# Patient Record
Sex: FEMALE | HISPANIC OR LATINO | Employment: PART TIME | URBAN - METROPOLITAN AREA
[De-identification: names, ages, dates, MRNs, and addresses within clinical notes are randomized per-mention and may not be internally consistent; named-entity substitution may affect disease eponyms.]

---

## 2021-11-09 ENCOUNTER — OFFICE VISIT (OUTPATIENT)
Dept: ORTHOPEDIC SURGERY | Facility: CLINIC | Age: 56
End: 2021-11-09

## 2021-11-09 VITALS
WEIGHT: 168 LBS | DIASTOLIC BLOOD PRESSURE: 78 MMHG | BODY MASS INDEX: 28.68 KG/M2 | HEART RATE: 86 BPM | SYSTOLIC BLOOD PRESSURE: 127 MMHG | HEIGHT: 64 IN

## 2021-11-09 DIAGNOSIS — M15.1 OSTEOARTHRITIS OF DISTAL INTERPHALANGEAL (DIP) JOINT OF LEFT MIDDLE FINGER: ICD-10-CM

## 2021-11-09 DIAGNOSIS — M15.1: ICD-10-CM

## 2021-11-09 DIAGNOSIS — M79.642 HAND PAIN, LEFT: Primary | ICD-10-CM

## 2021-11-09 PROCEDURE — 99203 OFFICE O/P NEW LOW 30 MIN: CPT | Performed by: PHYSICIAN ASSISTANT

## 2021-11-09 RX ORDER — BENAZEPRIL HYDROCHLORIDE 20 MG/1
TABLET ORAL
COMMUNITY
Start: 2021-10-18

## 2021-11-09 RX ORDER — GLIPIZIDE 10 MG/1
TABLET, FILM COATED, EXTENDED RELEASE ORAL
COMMUNITY
Start: 2021-10-18

## 2021-11-09 RX ORDER — PRAVASTATIN SODIUM 20 MG
TABLET ORAL
COMMUNITY
Start: 2021-10-18

## 2021-11-09 NOTE — PROGRESS NOTES
[facility] [place] [facility] Note    Subjective     Chief Complaint   Patient presents with   • Left Hand - Pain        HPI  Donna Restrepo is a 56 y.o. female.  Right-hand-dominant.  New patient presents for evaluation of left middle finger DIP pain.  Symptoms/pain have been ongoing for over 6 months.  YAHIR: No history of injury or trauma.    Pain scale: 5/10.  Severity of the pain mild to mod.  Quality of the pain shooting.  Associated symptoms swelling.  Activity related to pain use of digit.  Pain relieved by nothing.  No reported numbness or tingling.      Denies fever, chills, night sweats or other constitutional symptoms.      Past Medical History:   Diagnosis Date   • Diabetes (HCC)    • HTN (hypertension)       Past Surgical History:   Procedure Laterality Date   • HYSTERECTOMY        Family History   Problem Relation Age of Onset   • Diabetes Mother    • Hypertension Father      Social History     Socioeconomic History   • Marital status:    Tobacco Use   • Smoking status: Never Smoker   • Smokeless tobacco: Never Used   Substance and Sexual Activity   • Alcohol use: Not Currently   • Drug use: Never   • Sexual activity: Defer      Current Outpatient Medications on File Prior to Visit   Medication Sig Dispense Refill   • benazepril (LOTENSIN) 20 MG tablet      • glipizide (GLUCOTROL XL) 10 MG 24 hr tablet      • pravastatin (PRAVACHOL) 20 MG tablet        No current facility-administered medications on file prior to visit.      No Known Allergies     The following portions of the patient's history were reviewed and updated as appropriate: allergies, current medications, past family history, past medical history, past social history, past surgical history and problem list.    Review of Systems   Constitutional: Negative.    HENT: Negative.    Eyes: Negative.    Respiratory: Negative.    Cardiovascular: Negative.    Gastrointestinal: Negative.    Endocrine: Negative.    Genitourinary:  "Negative.    Musculoskeletal: Positive for arthralgias.   Skin: Negative.    Allergic/Immunologic: Negative.    Neurological: Negative.    Hematological: Negative.    Psychiatric/Behavioral: Negative.         Objective      Physical Exam  /78   Pulse 86   Ht 162.6 cm (64\")   Wt 76.2 kg (168 lb)   BMI 28.84 kg/m²     Body mass index is 28.84 kg/m².    GENERAL APPEARANCE: awake, alert & oriented x 3, in no acute distress and well developed, well nourished  PSYCH: normal mood and affect  LUNGS:  breathing nonlabored, no wheezing  EYES: sclera anicteric, pupils equal  CARDIOVASCULAR: palpable pulses. Capillary refill less than 2 seconds  INTEGUMENTARY: skin intact, no clubbing, cyanosis  NEUROLOGIC:  Normal Sensation         Ortho Exam  Left hand/middle finger  Skin: Grossly intact without any redness or warmth.  Positive soft tissue swelling noted about the DIP.  Patient also has palpable Heberden nodules noted.  Additionally presence of a ridge noted in the nail.  No evidence of a mucoid cyst at this time.  Tenderness: Positive tenderness noted dorsal DIP.  FDS, FDP and extensor tendon are intact.  Sensation intact to radial/ulnar/tip finger  Motor: Intact R/U/M/AIN/PIN  Sensory: Intact R/U/M   Vascular: 2+ radial pulse with brisk CR into each digit    Patient has similar findings on left ring finger but not symptomatic as middle finger.      Imaging/Studies  Ordered left hand plain films.  Imaging read/interpreted by Dr. Camara.    Imaging Results (Last 7 Days)     Procedure Component Value Units Date/Time    XR Hand 3+ View Left [[id number]] Resulted: 11/09/21 1652     Updated: 11/09/21 1653    Narrative:      Left Hand X-Ray    Indication: Pain    Views:  AP, Lateral, and Oblique     Comparison: None    Findings:  No fracture  No bony lesion  Normal soft tissues  There are severe degenerative changes noted at the long and ring digit DIP   joints.  Moderate to early severe degenerative changes are noted "   throughout the remainder of the IP joints of the hand.    Impression:   Degenerative changes as noted above.  No acute bony abnormality noted.            Assessment/Plan        ICD-10-CM ICD-9-CM   1. Hand pain, left  M79.642 729.5   2. Osteoarthritis of distal interphalangeal (DIP) joint of left middle finger  M15.1 715.94   3. Osteoarthritis of distal interphalangeal (DIP) joint of left ring finger  M15.1 715.94       Orders Placed This Encounter   Procedures   • XR Hand 3+ View Left        -Left hand pain to the middle finger DIP due to osteoarthritis--treatment options discussed with the patient including observation versus referral to a hand surgeon for further evaluation and treatment.  At this time patient wishes to proceed with observation only.  She will contact the clinic if and when she desires a referral to a hand surgeon for further evaluation and treatment.  -Was also explained that the groove she notes into the nail of the middle finger is secondary to the arthritis.  -Left ring finger DIP osteoarthritis--currently asymptomatic.  We will continue with observation at this time  -Recommend over-the-counter pain medication as needed.  -Follow-up as needed.  -Questions and concerns answered.      Medical Decision Making  Management Options : over-the-counter medicine  Data/Risk: radiology tests       Maria C Guerrero PA-C  11/12/21  13:34 EST               EMR Dragon/Transcription disclaimer:  Much of this encounter note is an electronic transcription of spoken language to printed text. Electronic transcription of spoken language may permit erroneous, or at times, nonsensical words or phrases to be inadvertently transcribed. Although I have reviewed the note for such errors, some may still exist.

## 2021-11-11 ENCOUNTER — TELEPHONE (OUTPATIENT)
Dept: ORTHOPEDIC SURGERY | Facility: CLINIC | Age: 56
End: 2021-11-11

## 2021-11-11 NOTE — TELEPHONE ENCOUNTER
Caller: ZOLTAN    Relationship: [facility]    Best call back number: [phone]    What form or medical record are you requesting: OFFICE NOTES FROM 11/9/2021 VISIT    Who is requesting this form or medical record from you: PCP OFFICE    How would you like to receive the form or medical records (pick-up, mail, fax): FAX   If fax, what is the fax number: [phone]